# Patient Record
Sex: MALE
[De-identification: names, ages, dates, MRNs, and addresses within clinical notes are randomized per-mention and may not be internally consistent; named-entity substitution may affect disease eponyms.]

---

## 2023-10-27 ENCOUNTER — DASHBOARD ENCOUNTERS (OUTPATIENT)
Age: 43
End: 2023-10-27

## 2023-10-27 ENCOUNTER — OFFICE VISIT (OUTPATIENT)
Dept: URBAN - METROPOLITAN AREA CLINIC 111 | Facility: CLINIC | Age: 43
End: 2023-10-27
Payer: SELF-PAY

## 2023-10-27 VITALS
HEIGHT: 62 IN | DIASTOLIC BLOOD PRESSURE: 79 MMHG | BODY MASS INDEX: 29.08 KG/M2 | TEMPERATURE: 97.6 F | WEIGHT: 158 LBS | SYSTOLIC BLOOD PRESSURE: 128 MMHG | HEART RATE: 77 BPM

## 2023-10-27 DIAGNOSIS — Z12.11 COLON CANCER SCREENING: ICD-10-CM

## 2023-10-27 DIAGNOSIS — R12 HEART BURN: ICD-10-CM

## 2023-10-27 DIAGNOSIS — Z86.19 HISTORY OF HELICOBACTER PYLORI INFECTION: ICD-10-CM

## 2023-10-27 PROCEDURE — 99203 OFFICE O/P NEW LOW 30 MIN: CPT | Performed by: NURSE PRACTITIONER

## 2023-10-27 RX ORDER — OMEPRAZOLE 40 MG/1
1 CAPSULE 30 MINUTES BEFORE MORNING MEAL CAPSULE, DELAYED RELEASE ORAL ONCE A DAY
Status: ACTIVE | COMMUNITY

## 2023-10-27 RX ORDER — FAMOTIDINE 20 MG/1
1 TABLET AT BEDTIME AS NEEDED TABLET, FILM COATED ORAL ONCE A DAY
Status: ACTIVE | COMMUNITY

## 2023-10-27 NOTE — HPI-TODAY'S VISIT:
44 yo male patient presents for heart burn.   The patient reports having stopped drinking soda, previously consuming around 3 to 4 cans per day. Following this change, he experienced gas and a distended stomach. The patient had a history of h pylori infection more than one month ago. He was treated with omeprazole, clindamycin, and amoxicillin, which led to improvement in his symptoms. The patient's doctor in [facility] recommended an endoscopy, but he did not have time to undergo the procedure during his visit to [facility]. Takes omeprazole and famotidine prn with some relief of heart burn. Denies fever, chills, abd pain, h/v, dysphagia, melena or [id number]. No family history of colon polyps or cancer. No prior colonoscopy. Not on blood thinner. Denies heart or lung diseases. Sees pcp for RHCM.

## 2023-11-06 ENCOUNTER — OFFICE VISIT (OUTPATIENT)
Dept: URBAN - METROPOLITAN AREA CLINIC 82 | Facility: CLINIC | Age: 43
End: 2023-11-06

## 2023-11-15 ENCOUNTER — OFFICE VISIT (OUTPATIENT)
Dept: URBAN - METROPOLITAN AREA SURGERY CENTER 15 | Facility: SURGERY CENTER | Age: 43
End: 2023-11-15

## 2025-05-05 NOTE — PHYSICAL EXAM EYES:
EOMI, anicteric sclera
[FreeTextEntry1] : Patient presents back to the office today offering no physical complaints.  He never followed up with regards to his sleep mask and has not been wearing it.  He says he feels that he sleeps well and he never is gasping for air and does not really feel that he needs to treat sleep apnea.  He reports no symptoms with his normal daily activities.  Unfortunately does continue to smoke about a pack of cigarettes a day.  Patient denies chest pain, shortness of breath, palpitations, orthopnea, presyncope, syncope.